# Patient Record
Sex: FEMALE | Race: BLACK OR AFRICAN AMERICAN | URBAN - METROPOLITAN AREA
[De-identification: names, ages, dates, MRNs, and addresses within clinical notes are randomized per-mention and may not be internally consistent; named-entity substitution may affect disease eponyms.]

---

## 2019-05-13 ENCOUNTER — OFFICE VISIT (OUTPATIENT)
Dept: URGENT CARE | Age: 79
End: 2019-05-13

## 2019-05-13 VITALS
OXYGEN SATURATION: 98 % | DIASTOLIC BLOOD PRESSURE: 90 MMHG | RESPIRATION RATE: 18 BRPM | HEART RATE: 81 BPM | WEIGHT: 180 LBS | TEMPERATURE: 97.7 F | BODY MASS INDEX: 25.77 KG/M2 | SYSTOLIC BLOOD PRESSURE: 191 MMHG | HEIGHT: 70 IN

## 2019-05-13 DIAGNOSIS — M47.816 SPONDYLOSIS OF LUMBAR SPINE: ICD-10-CM

## 2019-05-13 DIAGNOSIS — R29.6 FREQUENT FALLS: ICD-10-CM

## 2019-05-13 DIAGNOSIS — I10 MALIGNANT HYPERTENSION: ICD-10-CM

## 2019-05-13 DIAGNOSIS — M54.50 ACUTE LOW BACK PAIN WITHOUT SCIATICA, UNSPECIFIED BACK PAIN LATERALITY: Primary | ICD-10-CM

## 2019-05-13 LAB
ANION GAP BLD CALC-SCNC: 13 MMOL/L
BILIRUB UR QL STRIP: NEGATIVE
BUN BLD-MCNC: 7 MG/DL
CHLORIDE BLD-SCNC: 105 MMOL/L
CO2 BLD-SCNC: 28 MMOL/L
CREAT BLD-MCNC: 0.8 MG/DL (ref 0.6–1.3)
GLUCOSE BLD STRIP.AUTO-MCNC: 91 MG/DL
GLUCOSE UR-MCNC: NEGATIVE MG/DL
HCT VFR BLD CALC: 39 %
HGB BLD-MCNC: 13.3 G/DL
IONIZED CALCIUM ISTA,ICAI: 1.38
KETONES P FAST UR STRIP-MCNC: NEGATIVE MG/DL
PH UR STRIP: 7 [PH] (ref 4.6–8)
POTASSIUM BLD-SCNC: 3.8 MMOL/L
PROT UR QL STRIP: NEGATIVE
SODIUM BLD-SCNC: 141 MMOL/L
SP GR UR STRIP: 1.01 (ref 1–1.03)
UA UROBILINOGEN AMB POC: NORMAL (ref 0.2–1)
URINALYSIS CLARITY POC: NORMAL
URINALYSIS COLOR POC: NORMAL
URINE BLOOD POC: NORMAL
URINE LEUKOCYTES POC: NEGATIVE
URINE NITRITES POC: NEGATIVE

## 2019-05-13 RX ORDER — CLONIDINE HYDROCHLORIDE 0.1 MG/1
0.1 TABLET ORAL 2 TIMES DAILY
COMMUNITY

## 2019-05-13 RX ORDER — DULOXETIN HYDROCHLORIDE 60 MG/1
60 CAPSULE, DELAYED RELEASE ORAL DAILY
COMMUNITY

## 2019-05-13 RX ORDER — AMLODIPINE BESYLATE 5 MG/1
5 TABLET ORAL DAILY
Qty: 30 TAB | Refills: 0 | Status: SHIPPED | OUTPATIENT
Start: 2019-05-13

## 2019-05-13 RX ORDER — ERGOCALCIFEROL 1.25 MG/1
50000 CAPSULE ORAL
COMMUNITY

## 2019-05-13 RX ORDER — CETIRIZINE HCL 10 MG
10 TABLET ORAL
COMMUNITY

## 2019-05-13 NOTE — PATIENT INSTRUCTIONS

## 2019-05-13 NOTE — PROGRESS NOTES
Back Pain    The history is provided by the patient, caregiver and relative (daughter). This is a new problem. The current episode started more than 1 week ago. The problem has not changed since onset. The problem occurs daily. Patient reports not work related injury. The pain is associated with a fall (h/o frequent fall). The pain is present in the lumbar spine. The quality of the pain is described as aching. The pain does not radiate. The pain is at a severity of 5/10. The pain is moderate. The symptoms are aggravated by twisting and certain positions. Associated symptoms include weakness. Associated symptoms comments: no. She has tried nothing for the symptoms. Risk factors include poor posture. Past Medical History:   Diagnosis Date    Hypertension         Past Surgical History:   Procedure Laterality Date    ABDOMEN SURGERY PROC UNLISTED      bowel obstruction    HX CHOLECYSTECTOMY           History reviewed. No pertinent family history.      Social History     Socioeconomic History    Marital status: UNKNOWN     Spouse name: Not on file    Number of children: Not on file    Years of education: Not on file    Highest education level: Not on file   Occupational History    Not on file   Social Needs    Financial resource strain: Not on file    Food insecurity:     Worry: Not on file     Inability: Not on file    Transportation needs:     Medical: Not on file     Non-medical: Not on file   Tobacco Use    Smoking status: Never Smoker    Smokeless tobacco: Never Used   Substance and Sexual Activity    Alcohol use: Not on file    Drug use: Not on file    Sexual activity: Not on file   Lifestyle    Physical activity:     Days per week: Not on file     Minutes per session: Not on file    Stress: Not on file   Relationships    Social connections:     Talks on phone: Not on file     Gets together: Not on file     Attends [employer] service: Not on file     Active member of club or organization: Not on file     Attends meetings of clubs or organizations: Not on file     Relationship status: Not on file    Intimate partner violence:     Fear of current or ex partner: Not on file     Emotionally abused: Not on file     Physically abused: Not on file     Forced sexual activity: Not on file   Other Topics Concern    Not on file   Social History Narrative    Not on file                ALLERGIES: Penicillins    Review of Systems   Constitutional: Positive for activity change. Negative for appetite change, chills and unexpected weight change. Cardiovascular:        H/o HTN- not checking BP at home    Musculoskeletal: Positive for back pain. Neurological: Positive for dizziness, weakness and light-headedness. Negative for tremors. All other systems reviewed and are negative. Vitals:    05/13/19 1519 05/13/19 1633   BP: (!) 220/93 191/90   Pulse: 81    Resp: 18    Temp: 97.7 °F (36.5 °C)    SpO2: 98%    Weight: 180 lb (81.6 kg)    Height: 5' 10\" (1.778 m)        Physical Exam   Constitutional: She is oriented to person, place, and time. No distress. Musculoskeletal: She exhibits no edema. Cervical back: Normal.        Thoracic back: Normal.        Lumbar back: Normal.   Neurological: She is alert and oriented to person, place, and time. Coordination normal.   Skin: There is pallor. Nursing note and vitals reviewed. MDM    Procedures      ICD-10-CM ICD-9-CM    1. Acute low back pain without sciatica, unspecified back pain laterality M54.5 724.2 AMB POC URINALYSIS DIP STICK AUTO W/ MICRO      XR SPINE LUMB 2 OR 3 V      AMB POC ISTAT CHEM 8+      CANCELED: AMB POC URINE PREGNANCY TEST, VISUAL COLOR COMPARISON   2. 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 Frequent falls R29.6 V15.88    4.  Malignant hypertension I10 401.0        Monitor BP  Neurology evaluation for fall  Pt for strengthening and low back pain     Medications Ordered Today   Medications    amLODIPine (NORVASC) 5 mg tablet     Sig: Take 1 Tab by mouth daily. Dispense:  30 Tab     Refill:  0     Results for orders placed or performed in visit on 05/13/19   XR SPINE LUMB 2 OR 3 V    Narrative    Clinical Indication:  low back pain    3 views of the lumbar spine    Comparison: None    Findings: There is a mild rightward convexity scoliosis of the lumbar spine,  with mild multilevel degenerative spondylosis. There is no evidence of fracture  or dislocation. There is suspected slight anterolisthesis at L4-L5. There are  right upper quadrant surgical clips. Impression    Impression:    1. No acute osseous abnormality. Results for orders placed or performed in visit on 05/13/19   AMB POC URINALYSIS DIP STICK AUTO W/ MICRO   Result Value Ref Range    Color (UA POC)      Clarity (UA POC)      Glucose (UA POC) Negative Negative    Bilirubin (UA POC) Negative Negative    Ketones (UA POC) Negative Negative    Specific gravity (UA POC) 1.015 1.001 - 1.035    Blood (UA POC) Trace Negative    pH (UA POC) 7.0 4.6 - 8.0    Protein (UA POC) Negative Negative    Urobilinogen (UA POC) 0.2 mg/dL 0.2 - 1    Nitrites (UA POC) Negative Negative    Leukocyte esterase (UA POC) Negative Negative   AMB POC ISTAT CHEM 8+   Result Value Ref Range    Sodium,  MMOL/L    Potassium, POC 3.8 MMOL/L    Chloride,  MMOL/L    Calcium, ionized (POC) 1.38     CO2, POC 28 MMOL/L    Glucose, POC 91 MG/DL    BUN, POC 7 MG/DL    Creatinine, POC 0.8 0.6 - 1.3 MG/DL    Hematocrit, POC 39 %    Hemoglobin, POC 13.3 G/DL    Anion gap, POC 13 MMOL/L     The patients condition was discussed with the patient and they understand. The patient is to follow up with primary care doctor. If signs and symptoms become worse the pt is to go to the ER. The patient is to take medications as prescribed.